# Patient Record
(demographics unavailable — no encounter records)

---

## 2025-04-07 NOTE — REASON FOR VISIT
[Initial Evaluation] : an initial evaluation [Hypercalcemia] : hypercalcemia [Spouse] : spouse [FreeTextEntry2] : Dr. Linares

## 2025-04-07 NOTE — REVIEW OF SYSTEMS
[Fatigue] : fatigue [Abdominal Pain] : abdominal pain [Heartburn] : heartburn [Muscle Cramps] : muscle cramps [Depression] : no depression [Suicidal Ideation] : no suicidal ideation [Insomnia] : insomnia [Anxiety] : anxiety [Homicidal Ideation] : no homicidal ideation [Stress] : no stress [Negative] : Heme/Lymph [FreeTextEntry7] : hiatal hernia

## 2025-04-07 NOTE — PHYSICAL EXAM
[Alert] : alert [Well Nourished] : well nourished [No Acute Distress] : no acute distress [Well Developed] : well developed [Normal Sclera/Conjunctiva] : normal sclera/conjunctiva [EOMI] : extra ocular movement intact [No Proptosis] : no proptosis [Normal Oropharynx] : the oropharynx was normal [Thyroid Not Enlarged] : the thyroid was not enlarged [No Thyroid Nodules] : no palpable thyroid nodules [No Respiratory Distress] : no respiratory distress [No Accessory Muscle Use] : no accessory muscle use [Clear to Auscultation] : lungs were clear to auscultation bilaterally [Normal S1, S2] : normal S1 and S2 [Normal Rate] : heart rate was normal [Regular Rhythm] : with a regular rhythm [No Edema] : no peripheral edema [Pedal Pulses Normal] : the pedal pulses are present [Normal Bowel Sounds] : normal bowel sounds [Not Tender] : non-tender [Not Distended] : not distended [Soft] : abdomen soft [Normal Anterior Cervical Nodes] : no anterior cervical lymphadenopathy [Normal Posterior Cervical Nodes] : no posterior cervical lymphadenopathy [No CVA Tenderness] : no ~M costovertebral angle tenderness [No Spinal Tenderness] : no spinal tenderness [Spine Straight] : spine straight [Kyphosis] : no kyphosis present [Scoliosis] : no scoliosis [No Stigmata of Cushings Syndrome] : no stigmata of Cushings Syndrome [Normal Gait] : normal gait [Normal Strength/Tone] : muscle strength and tone were normal [No Rash] : no rash [Acanthosis Nigricans] : no acanthosis nigricans [Normal Reflexes] : deep tendon reflexes were 2+ and symmetric [No Tremors] : no tremors [Oriented x3] : oriented to person, place, and time [de-identified] : elevated calcium

## 2025-04-07 NOTE — HISTORY OF PRESENT ILLNESS
[FreeTextEntry1] : Patient came for high calcium level. Patient said mother had hypothyroid. Patient googling symptoms. Complaint of fatigue. Complaint of restless sleep. history of IBS x 50 yeas. Complaint of muscle aches.

## 2025-04-07 NOTE — DATA REVIEWED
[FreeTextEntry1] : 11/7/25 calcium 11.3, gfr 91, chol 220, hdl 90, triog 56, ldld 119, non hdl 130, hgba1c 5.4  11/22/ 24 calcium ionized 6.3

## 2025-07-01 NOTE — PHYSICAL EXAM
[Alert] : alert [Well Nourished] : well nourished [No Acute Distress] : no acute distress [Well Developed] : well developed [Normal Sclera/Conjunctiva] : normal sclera/conjunctiva [EOMI] : extra ocular movement intact [PERRL] : pupils equal, round and reactive to light [No Proptosis] : no proptosis [Normal Outer Ear/Nose] : the ears and nose were normal in appearance [Normal Hearing] : hearing was normal [Supple] : the neck was supple [Thyroid Not Enlarged] : the thyroid was not enlarged [No Respiratory Distress] : no respiratory distress [No Accessory Muscle Use] : no accessory muscle use [Clear to Auscultation] : lungs were clear to auscultation bilaterally [Normal S1, S2] : normal S1 and S2 [Normal Rate] : heart rate was normal [Regular Rhythm] : with a regular rhythm [Carotids Normal] : carotid pulses were normal with no bruits [No Edema] : no peripheral edema [Pedal Pulses Normal] : the pedal pulses are present [Normal Bowel Sounds] : normal bowel sounds [Not Tender] : non-tender [Not Distended] : not distended [Soft] : abdomen soft [Normal Anterior Cervical Nodes] : no anterior cervical lymphadenopathy [No CVA Tenderness] : no ~M costovertebral angle tenderness [No Spinal Tenderness] : no spinal tenderness [Spine Straight] : spine straight [No Stigmata of Cushings Syndrome] : no stigmata of Cushings Syndrome [Normal Gait] : normal gait [Normal Strength/Tone] : muscle strength and tone were normal [No Rash] : no rash [No Motor Deficits] : the motor exam was normal [Normal Reflexes] : deep tendon reflexes were 2+ and symmetric [No Tremors] : no tremors [Oriented x3] : oriented to person, place, and time [Normal Affect] : the affect was normal [Normal Mood] : the mood was normal [Kyphosis] : no kyphosis present [Scoliosis] : no scoliosis [Acanthosis Nigricans] : no acanthosis nigricans [de-identified] : bmi 28.53 [de-identified] : 6/30/25 sonogram and discussed and noted she has 3 large nodules and left lob has hypoechoic nodule 1.4 x 1.5 x 0.7 cm suspicious for parathyroid adenoma. [de-identified] : elevated calcium, thyroid nodules, possible parathyroid adenoma

## 2025-07-01 NOTE — PHYSICAL EXAM
[Alert] : alert [Well Nourished] : well nourished [No Acute Distress] : no acute distress [Well Developed] : well developed [Normal Sclera/Conjunctiva] : normal sclera/conjunctiva [EOMI] : extra ocular movement intact [PERRL] : pupils equal, round and reactive to light [No Proptosis] : no proptosis [Normal Outer Ear/Nose] : the ears and nose were normal in appearance [Normal Hearing] : hearing was normal [Supple] : the neck was supple [Thyroid Not Enlarged] : the thyroid was not enlarged [No Respiratory Distress] : no respiratory distress [No Accessory Muscle Use] : no accessory muscle use [Clear to Auscultation] : lungs were clear to auscultation bilaterally [Normal S1, S2] : normal S1 and S2 [Normal Rate] : heart rate was normal [Regular Rhythm] : with a regular rhythm [Carotids Normal] : carotid pulses were normal with no bruits [No Edema] : no peripheral edema [Pedal Pulses Normal] : the pedal pulses are present [Normal Bowel Sounds] : normal bowel sounds [Not Tender] : non-tender [Not Distended] : not distended [Soft] : abdomen soft [Normal Anterior Cervical Nodes] : no anterior cervical lymphadenopathy [No CVA Tenderness] : no ~M costovertebral angle tenderness [No Spinal Tenderness] : no spinal tenderness [Spine Straight] : spine straight [No Stigmata of Cushings Syndrome] : no stigmata of Cushings Syndrome [Normal Gait] : normal gait [Normal Strength/Tone] : muscle strength and tone were normal [No Rash] : no rash [No Motor Deficits] : the motor exam was normal [Normal Reflexes] : deep tendon reflexes were 2+ and symmetric [No Tremors] : no tremors [Oriented x3] : oriented to person, place, and time [Normal Affect] : the affect was normal [Normal Mood] : the mood was normal [Kyphosis] : no kyphosis present [Scoliosis] : no scoliosis [Acanthosis Nigricans] : no acanthosis nigricans [de-identified] : bmi 28.53 [de-identified] : 6/30/25 sonogram and discussed and noted she has 3 large nodules and left lob has hypoechoic nodule 1.4 x 1.5 x 0.7 cm suspicious for parathyroid adenoma. [de-identified] : elevated calcium, thyroid nodules, possible parathyroid adenoma

## 2025-07-01 NOTE — DATA REVIEWED
[FreeTextEntry1] : 6/30/25 sonogram and discussed and noted she has 3 large nodules and left lob has hypoechoic nodule 1.4 x 1.5 x 0.7 cm suspicious for parathyroid adenoma.

## 2025-07-01 NOTE — REASON FOR VISIT
[Follow - Up] : a follow-up visit [Hypercalcemia] : hypercalcemia [Other___] : [unfilled] [Spouse] : spouse [Hyperparathyroidism] : hyperparathyroidism [FreeTextEntry2] : Vijay

## 2025-07-01 NOTE — HISTORY OF PRESENT ILLNESS
[FreeTextEntry1] : Patient came for high calcium level. 7/1/25 Patient came for follow up with her . Patient was recall for elevated vitamin D 181.0 Take  Ffgard for IBS & stop Biflex for muscle ache, but she started in 2 weeks. Review the 6/30/25 sonogram and discussed and noted she has 3 large nodules and left lob has hypoechoic nodule 1.4 x 1.5 x 0.7 cm suspicious for parathyroid adenoma. Discussed s/s of hyper calcium. Will refer to Dr. Linares.  4/7/25 Patient said mother had hypothyroid. Patient googling symptoms. Complaint of fatigue. Complaint of restless sleep. history of IBS x 50 yeas. Complaint of muscle aches.

## 2025-07-01 NOTE — ASSESSMENT
[FreeTextEntry1] : 1. hyper calcium/ elevated pth/ elevated vit d Review with patient to stop supplimentation with vitamin d or calcium limit the calcium intake 1-2 serving a day possible parathyroid adenoma- ordered CT of neck 4 D parathyroid without and with IV contrast.  2. multiple thyroid nodules Refer to Dr. Mckoy for evaluation  repeat labs in 2-3 months.